# Patient Record
Sex: MALE | Race: WHITE | Employment: FULL TIME | URBAN - NONMETROPOLITAN AREA
[De-identification: names, ages, dates, MRNs, and addresses within clinical notes are randomized per-mention and may not be internally consistent; named-entity substitution may affect disease eponyms.]

---

## 2021-05-14 ENCOUNTER — HOSPITAL ENCOUNTER (OUTPATIENT)
Dept: PSYCHIATRY | Age: 26
Setting detail: THERAPIES SERIES
Discharge: HOME | End: 2021-05-14
Payer: COMMERCIAL

## 2021-05-14 PROCEDURE — 80305 DRUG TEST PRSMV DIR OPT OBS: CPT

## 2021-05-14 PROCEDURE — 90791 PSYCH DIAGNOSTIC EVALUATION: CPT

## 2021-05-14 ASSESSMENT — ANXIETY QUESTIONNAIRES
5. BEING SO RESTLESS THAT IT IS HARD TO SIT STILL: 0-NOT AT ALL
3. WORRYING TOO MUCH ABOUT DIFFERENT THINGS: 0-NOT AT ALL
6. BECOMING EASILY ANNOYED OR IRRITABLE: 0-NOT AT ALL
1. FEELING NERVOUS, ANXIOUS, OR ON EDGE: 0-NOT AT ALL

## 2021-05-14 ASSESSMENT — LIFESTYLE VARIABLES: HISTORY_ALCOHOL_USE: YES

## 2021-05-14 NOTE — PROGRESS NOTES
[facility]                Progress Note    [] Jaden  [x] Hillary hdz                    Patient Name: Ivania Jaime   : 1995     Case # :  [id number]  Therapist: Micha Dumont        Objective/Service/Time:    UDS . 28 IT K1.0 Topic Asmt  S- client reported Alcohol abuse 5 years, 2 JANETH's.   O- Denies MH, Oriented and cooperative  A- Asmt complete next week, SIMON for Self, Pending Court, UDS Complete  P- GT, IT, work out                  Micha Dumont MA, [facility], RAISA, Marjorie 27 21, 2:51 PM

## 2021-05-14 NOTE — PROGRESS NOTES
[facility]                     CLINICAL DIAGNOSIS SUMMARY    Location: [] [place] [] [place]                   Patient Name: Vivi Casey   : 1995     Case # :  DM  Therapist: Jeff العراقي      1. Identifying information:  Vivi Casey / 1995        WSM, 23, resides with Mother, Step Father, and works FT at [employer] and enjoys working on model cars    2. Substance use history:  F10.20 Other and unspecified alcohol dependence/unspecified drinking behavior       Drinking age [age], Beer 4-5 on weekends up to 25 tolerance no withdraws, and some blackouts, increase drinking due to socialization with peers. 3. Consequences of substance use: (personal, inter-personal, legal, occupational, medical, nutritional,       Leisure, spiritual, etc.)            2 JANETH's  , 3 day class  2021, [place] [address] Hearing    4. Co-existing problems;  (mental health, psychiatric, previous treatment programs, family       Problems, social, educational, etc.)         Denies    5. Treatment needs, barriers to treatment, impact of disease on life:     No transportation    Summary of Medical History:  Prior to Admission medications    Not on File     Past Surgical History:   Procedure Laterality Date    FOOT SURGERY      TONSILLECTOMY       History reviewed. No pertinent past medical history. There are no active problems to display for this patient. 6. Level of Care Determination:      1 Outpatient Services   7. Treatment available      __x__yes   _____no         8.   Name of program referred:    _x___[facility],    ____[facility],       _______other/identify     Electronically signed by Jeff العراقي on 2021 at 2:17 PM

## 2021-05-14 NOTE — PROGRESS NOTES
[address] [place] [address] Urinalysis Laboratory Testing and Medical History      Location: [] [place] [x] Natalie Powers MD., [id number], Medical Director of [facility] Director orders for [address] [place] [address] clinical therapists to collect an urine sample from:    Client: Ivania Jaime   : 1995   Case# DM    Urine sample will be collected following the collections guidelines provided on Clinical Reference Laboratory [facility]) custody form, and completion of the [facility] Non-Federal chain of custody drug screening form. During the course of treatment, randomly a urine sample will be collected, at a minimum of one time a month, more frequently as needed, as part of the clinical outpatient alcohol and drug treatment program at [address] [place] [address]. Medical care recommendation for clients experiencing/reporting  medical concerns, who do not have a family physician and willing to attend  medical care will be assisted in seeking medical care. Clinical providers will refer clients to local family physicians practices as part of the  clients treatment plan and assist the client in gaining access to an appointment. Release of information will be requested to support the  clients seeking medical care. Summary of Medical History  Prior to Admission medications    Not on File     Past Surgical History:   Procedure Laterality Date    FOOT SURGERY      TONSILLECTOMY       History reviewed. No pertinent past medical history. There are no active problems to display for this patient.       Micha Dumont [facility]  2021/1:51 PM

## 2021-05-21 ENCOUNTER — HOSPITAL ENCOUNTER (OUTPATIENT)
Dept: PSYCHIATRY | Age: 26
Setting detail: THERAPIES SERIES
Discharge: HOME | End: 2021-05-21
Payer: COMMERCIAL

## 2021-05-21 PROCEDURE — 90834 PSYTX W PT 45 MINUTES: CPT

## 2021-05-21 NOTE — PROGRESS NOTES
[facility]                Progress Note    [] Jaden  [x] Hillary hdz                    Patient Name: Dwain Tello   : 1995     Case # :  [id number]  Therapist: Lennox Pickler, [facility]        Objective/Service/Time:    K1.0, Treatment Planning in Ind Counseling  S- Client reports, due to work and yard work. Client is goal oriented. O- Client positive attitude towards counseling  A- Treatment planning, and reality therapy use [place]. P- GT and IT, working out.                   Lennox Pickler, MA, CASEY, LSW, MAC 21, 2:44 PM

## 2021-05-27 ENCOUNTER — HOSPITAL ENCOUNTER (OUTPATIENT)
Dept: PSYCHIATRY | Age: 26
Setting detail: THERAPIES SERIES
Discharge: HOME | End: 2021-05-27
Payer: COMMERCIAL

## 2021-05-27 PROCEDURE — 90834 PSYTX W PT 45 MINUTES: CPT

## 2021-05-27 PROCEDURE — 90853 GROUP PSYCHOTHERAPY: CPT

## 2021-05-27 ASSESSMENT — ANXIETY QUESTIONNAIRES
5. BEING SO RESTLESS THAT IT IS HARD TO SIT STILL: 1
GAD7 TOTAL SCORE: 3
1. FEELING NERVOUS, ANXIOUS, OR ON EDGE: 0
3. WORRYING TOO MUCH ABOUT DIFFERENT THINGS: 1
2. NOT BEING ABLE TO STOP OR CONTROL WORRYING: 0

## 2021-05-27 NOTE — PROGRESS NOTES
[address] [facility]                Progress Note    [] Jaden  [x] Sonia Carlson                    Patient Name: Leticia Santamaria   : 1995     Case # :  [id number]  Therapist: Jose Manuel Segura [facility]        Objective/Service/Time:    K1.0 , Ind Counseling Topic: -  S-Client reports, working and supporting with family. Client working at [employer] and enjoying his family. O- Client cooperative, full range, motivated, sober per his report  A-Client and this writer discussed impact of social and binge drinking.  Client reported his style of drinking, boredom and social.  P- GT, Work, finding hobby, IT                Jose Manuel Segura MA, [place], [place], KARLY MILLAN [facility] [place] 21, 8:56 AM

## 2021-05-28 ENCOUNTER — APPOINTMENT (OUTPATIENT)
Dept: PSYCHIATRY | Age: 26
End: 2021-05-28
Payer: COMMERCIAL

## 2021-06-03 ENCOUNTER — HOSPITAL ENCOUNTER (OUTPATIENT)
Dept: PSYCHIATRY | Age: 26
Setting detail: THERAPIES SERIES
Discharge: HOME | End: 2021-06-03
Payer: COMMERCIAL

## 2021-06-03 PROCEDURE — 90834 PSYTX W PT 45 MINUTES: CPT

## 2021-06-03 PROCEDURE — 90853 GROUP PSYCHOTHERAPY: CPT

## 2021-06-04 ENCOUNTER — APPOINTMENT (OUTPATIENT)
Dept: PSYCHIATRY | Age: 26
End: 2021-06-04
Payer: COMMERCIAL

## 2021-06-10 ENCOUNTER — HOSPITAL ENCOUNTER (OUTPATIENT)
Dept: PSYCHIATRY | Age: 26
Setting detail: THERAPIES SERIES
Discharge: HOME | End: 2021-06-10
Payer: COMMERCIAL

## 2021-06-10 PROCEDURE — 90834 PSYTX W PT 45 MINUTES: CPT

## 2021-06-10 PROCEDURE — 90853 GROUP PSYCHOTHERAPY: CPT

## 2021-06-10 NOTE — GROUP NOTE
[address] [facility] Group Therapy Note      6/10/2021    Location:  [employer]    Clients Presents: [phone]     Clients Absent: [phone]    Length of session: 1.5 hours    Group Note: OP    Group Type: Co-Ed    New members were welcomed and introduced. Norms and expectations of group were discussed. Content: Client's discussed Step 2, 3, surrendering your addiction & control to a Higher Power, viewed & discussed Shaken by Dania Yao, and 3rd Step by Father Destini Ha. Client applied to their sober journey and story. [facility] [place]  6/10/2021 11:39 AM    Co-Therapist: N/A      [facility] Individual Group Progress Note    Francia Patterson  1995  6/10/2021    Notes on Client Progress in Group    Client maintaining by working, fellowship with family, attending open [employer], Client came to believe he needed help when he blackout after drinking and his mother was visibly upset.   Client maintains sober living    [facility] [place]  6/10/2021 11:48 AM    Co-Therapist: N/A

## 2021-06-10 NOTE — PROGRESS NOTES
[facility]                Progress Note    [] Jaden  [x] Hillary hdz                    Patient Name: Luma Reynoso   : 1995     Case # :  [id number]  [facility]        Objective/Service/Time:    IT 1.0 K1.0  4 challenges of early recovery  S- Client reports staying busy with work, home task and attending open houses. Client filled out transportation application for tokens. O- Client focused cooperative and engaged  A-Client and this writer discussed the 4 challenges of early recovery. Client cooperative and engaged. P- Client displaying responsibility to work, treatment and sober living by maintaining a job attending treatment.                   Jace Hager MA, CASEY, LSW, MAC 06/10/21, 9:10 AM

## 2021-06-11 ENCOUNTER — APPOINTMENT (OUTPATIENT)
Dept: PSYCHIATRY | Age: 26
End: 2021-06-11
Payer: COMMERCIAL

## 2021-06-17 ENCOUNTER — HOSPITAL ENCOUNTER (OUTPATIENT)
Dept: PSYCHIATRY | Age: 26
Setting detail: THERAPIES SERIES
Discharge: HOME | End: 2021-06-17
Payer: COMMERCIAL

## 2021-06-17 PROCEDURE — 90853 GROUP PSYCHOTHERAPY: CPT

## 2021-06-17 PROCEDURE — 80305 DRUG TEST PRSMV DIR OPT OBS: CPT

## 2021-06-17 PROCEDURE — 90834 PSYTX W PT 45 MINUTES: CPT

## 2021-06-17 NOTE — PROGRESS NOTES
[facility]                Progress Note    [] Jaden  [x] Hillary wilder                    Patient Name: Holley Bergeron   : 1995     Case # :  [id number]  Therapist: Lupe Oseguera, [address]        Objective/Service/Time:    K1.0 UDS . 35 Topic:  Treatment book, BUDDING  S- Client reports, busy at [employer] with  [address]. Client helping with chores at home. O- Client motivated, focused and engaged, waiting hearing in 2021  A- Client and this writer processed waiting on sentencing. Client further discussed BUDDING, and Relapse Prevention tools, [place]. P- GT, IT, work and family support.                       Lupe Oseguera MA, LPC, LSW, MAC 21, 8:50 AM
Never smoker

## 2021-06-17 NOTE — GROUP NOTE
612 [facility] Group Therapy Note      6/17/2021    Location:  [facility]    Clients Presents: [phone]    Clients Absent: [phone]    Length of session: 1.5 hours    Group Note: OP    Group Type: Co-Ed    New members were welcomed and introduced. Norms and expectations of group were discussed. Content: Client discussed the Serenity Tool, Wisemind Tool, and Listened to Father Tor Guillermo and discussed taking Moral Inventory. Jeff [facility]  6/17/2021 11:39 AM    Co-Therapist: N/A      [facility] Individual Group Progress Note    Vivi Casey  1995 6/17/2021    Notes on Client Progress in Group  Client displays attentive, cooperative and supportive behaviors. Client relies on work, and enjoys remodeling cars.  Client denies use    Jeff [facility]  6/17/2021 11:49 AM    Co-Therapist: N/A

## 2021-06-18 ENCOUNTER — APPOINTMENT (OUTPATIENT)
Dept: PSYCHIATRY | Age: 26
End: 2021-06-18
Payer: COMMERCIAL

## 2021-06-24 ENCOUNTER — HOSPITAL ENCOUNTER (OUTPATIENT)
Dept: PSYCHIATRY | Age: 26
Setting detail: THERAPIES SERIES
Discharge: HOME | End: 2021-06-24
Payer: COMMERCIAL

## 2021-06-24 PROCEDURE — 90853 GROUP PSYCHOTHERAPY: CPT

## 2021-06-24 PROCEDURE — 90834 PSYTX W PT 45 MINUTES: CPT

## 2021-06-24 NOTE — PROGRESS NOTES
[address] [facility]                Progress Note    [] Jaden  [x] Danny Nguyen                    Patient Name: Deedee Juárez   : 1995     Case # :   Therapist: Kamila Huggins [facility]        Objective/Service/Time:    K1.0  Ind Counseling  making the most out of my strengths:lesson   preparing for hobbies  S- Client reports, preparing for shut down, Client's plans stay home and take care of the animals and hopefully work on his car. O- Client reports, determined, focused and honest in his response to sober living  A- Client and this writer discussed his motivation and his strengths that enable him to be sober. 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 P- Next week GT, check out smart recovery or sober grid, focus on hobbies.                   Kamila Huggins MA, CASEY, LSW, [facility] 21, 8:48 AM

## 2021-06-24 NOTE — GROUP NOTE
612 [facility] Group Therapy Note      6/24/2021    Location:  [facility]    Clients Presents: [phone]    Clients Absent:     Length of session: 1.5 hours    Group Note: OP    Group Type: Co-Ed    New members were welcomed and introduced. Norms and expectations of group were discussed. Content: Client's discussed the disease concept and tools to prevent relapse from Smart Recovery. Client's discussed D. Strawberry Story of recovery and 4 more days story. [place]  6/24/2021 11:42 AM    Co-Therapist: N/A      [facility] Individual Group Progress Note    Ariela Johnson  1995 6/24/2021    Notes on Client Progress in Group    Client reported staying focused per work and preparing for hobbies. Client was attentive and displays a positive attitude in GT.      [place]  6/24/2021 11:46 AM    Co-Therapist: N/A

## 2021-07-01 ENCOUNTER — HOSPITAL ENCOUNTER (OUTPATIENT)
Dept: PSYCHIATRY | Age: 26
Setting detail: THERAPIES SERIES
Discharge: HOME | End: 2021-07-01
Payer: COMMERCIAL

## 2021-07-01 NOTE — GROUP NOTE
612 [facility] Group Therapy Note      7/1/2021    Location:  [facility] [employer]    Clients Presents: [address] [facility], [address] [place] [address], [phone]    Clients Absent: [phone]    Length of session: 1.5 hours    Group Note: OP    Group Type: Co-Ed    New members were welcomed and introduced. Norms and expectations of group were discussed. Content:  Clients introduced themselves, reviewed intro sheet, reviewed brief history of what brought them to treatment, completed worksheet on the positive supports in their lives to support their sobriety    Nyasia Montalvo  7/7/0542 9:75 PM    Co-Therapist: N/A      [facility] Individual Group Progress Note    Jessica Alexandre  1995 7/1/2021    Notes on Client Progress in Group  Client reported alcohol use, 2nd JANETH, works at [employer], they are off on week shut down. Report family support of his sobriety, but stated they don't talk much in his family, and wasn't sure who he would talk to for support.     Reason for Absence: kept    Zulma Yadav [facility]  0/7/8691 0:46 PM    Co-Therapist: N/A

## 2021-07-08 ENCOUNTER — HOSPITAL ENCOUNTER (OUTPATIENT)
Dept: PSYCHIATRY | Age: 26
Setting detail: THERAPIES SERIES
Discharge: HOME | End: 2021-07-08
Payer: COMMERCIAL

## 2021-07-08 PROCEDURE — 90853 GROUP PSYCHOTHERAPY: CPT

## 2021-07-08 PROCEDURE — 90834 PSYTX W PT 45 MINUTES: CPT

## 2021-07-08 NOTE — PROGRESS NOTES
[facility]                Progress Note    [] Jaden  [x] Hillary hdz                    Patient Name: Kel Swenson   : 1995     Case # :  [id number]  [facility]        Objective/Service/Time:    IT 1.0 K 1.0 Topic: Holiday and Preparing for Court, , Relapse Supports  S- Client reports, holiday and vacation was boring. Client has court next week and has spoke with . Client received tokens and progress reports, copies. O- Cooperative, focused, engaged  A- Client discussed court and preparing, sober supports online and Apps. Client wanting to start hobbies, remodeling cars and going to car shows. P- GT,IT, Online resources, Hobbies and attending court.                 Xena Vega MA, LPC, LSW, MAC 21, 9:15 AM

## 2021-07-15 ENCOUNTER — HOSPITAL ENCOUNTER (OUTPATIENT)
Dept: PSYCHIATRY | Age: 26
Setting detail: THERAPIES SERIES
Discharge: HOME | End: 2021-07-15
Payer: COMMERCIAL

## 2021-07-15 PROCEDURE — 90853 GROUP PSYCHOTHERAPY: CPT

## 2021-07-15 PROCEDURE — 90834 PSYTX W PT 45 MINUTES: CPT

## 2021-07-15 NOTE — GROUP NOTE
[address] [facility] Group Therapy Note      7/15/2021    Location:  [employer]    Clients Presents: [phone]    Clients Absent:     Length of session: 1.5 hours    Group Note: OP    Group Type: Co-Ed    New members were welcomed and introduced. Norms and expectations of group were discussed. Content: Client's discussed managing your mood, changes of change and Win [employer] (Mindfulness/Flip the Switch Habit). [facility] [place]  7/15/2021 11:50 AM    Co-Therapist: N/A      [facility] Individual Group Progress Note    Silvia Guillen  1995  7/15/2021    Notes on Client Progress in Group    Client encouraged by court outcome, family support and work. Client aware of others moods and generally \"lets it roll off of is back\".     Emaefrain Kuo [facility]  7/15/2021 11:59 AM    Co-Therapist: N/A

## 2021-07-15 NOTE — PROGRESS NOTES
[facility]                Progress Note    [] Jaden  [x] Hillary hdz                    Patient Name: Hetal Schafer   : 1995     Case # :  [id number]  Therapist: Shayy Upton, [address]        Objective/Service/Time:    1 Hour, Ind Counseling -2- completion of Treatment book, Review of 7  the day skills (IT and GT), 3/1 legal update  S-Client reports, very tired over slept. Client stated graduation party for sister went well. Court sentencing went well, Probation, Alcohol Monitoring, and 5 days in [place] (over labor day). O- Client focused, engaged, relieved, tired  A- Client discussed [place] [facility] and completed the treatment book, and discussed legal sentencing at [employer]. P- Probation meeting next week, work, online supports, monitoring of alcohol.                 Shayy Upton MA, LPC, LSW, [facility] 07/15/21, 9:23 AM

## 2021-07-15 NOTE — PROGRESS NOTES
612 [facility] TREATMENT PLAN      Location: [] [place] [x] Hillary hdz    Treatment plan: Initial    Strengths: work ethic    Weakness/Limitations: Use of Alcohol, Legal    Service/Frequency/Duration: IT, GT Weekly, UDS . 35 all in 90 days    Diagnosis: F10.20 Other and unspecified alcohol dependence/unspecified drinking behavior    Level of Care: 1 Outpatient Services    1. Problem: Abuse of Alcohol   a. Goal: Develop a plan of sober living in 90 days  b. Objectives:   i. 1) Client Utilize treatment booklet to develop a Relapse Prevention Plan in 90 days Evaluation Date: 8/21/21 Code: C Continue TBD Achieved, 7/15/21  ii. 2) Client utilize the [employer], other activities 2-3 monthly in 90 days Evaluation Date: 8/21/21 Code: C Continue TBD      2. Problem: Stressors related to family and boredom  a. Goal: Develop coping plan to find skills address these problems in 90 days  b. Objectives:   i. 1) Learn 7 skills in 90 days  Evaluation Date: 8/21/21Code: C Continue TBD [employer], GT on 7/15/21 Achieved  ii. 2) Seek out positive peer supports and professional supports 2x monthly in 90 days Evaluation Date: 8/21/21Code: C Continue TBD       3. Problem: Legal  a. Goal: Meet expectation of [employer] in 90 days  b. Objectives:   i. 1) Attend hearing, meetings and pay sanctions in 90 days Evaluation Date: 8/21/21 Code: C Continue TBD On Probation 2 years, [place] 18 days and 5 days in [place], ongoing 7/15/21      Defer: Develop a Career    Discharge Plan/Instructions: Client is ready to maintain sober life and utilize skills & supports to accomplish this. Ana Hollis / 1995 has participated in the treatment plan development outlined above on 7/15/2021.      Jordi Petersen [facility]  7/15/2021/9:19 AM

## 2021-07-22 ENCOUNTER — HOSPITAL ENCOUNTER (OUTPATIENT)
Dept: PSYCHIATRY | Age: 26
Setting detail: THERAPIES SERIES
Discharge: HOME | End: 2021-07-22
Payer: COMMERCIAL

## 2021-07-22 PROCEDURE — 90853 GROUP PSYCHOTHERAPY: CPT

## 2021-07-22 PROCEDURE — 90834 PSYTX W PT 45 MINUTES: CPT

## 2021-07-22 NOTE — PROGRESS NOTES
[address] [facility]                Progress Note    [] [place]  [] Hillary hdz                    Patient Name: Kel Swenson   : 1995     Case # : [id number]  Therapist: Xena Vega, [address]        Objective/Service/Time:    IT, 1 Hour, UDS . 35   Coping -2 JD coping  S- Client reports, coping with work and waiting on being assigned a PO. Client aware of emotions and JD. O- Discussed and cooperative, JD at work and home  A- Client and this writer processed delay in probation. Client noted some stressors at home. Client recognizes his Style Under Stress & does not to be around a lot of people and \"drama\"  P-  Tentative completion Mid August, use coping skills, PO meeting Tuesday.                   Xena Vega MA, LPC, LSW, MAC 21, 9:02 AM

## 2021-07-22 NOTE — GROUP NOTE
[address] [facility] Group Therapy Note      7/22/2021    Location:  [employer]    Clients XIIOALBL:[phone]    Clients Absent: [phone]    Length of session: 1.5 hours    Group Note: OP    Group Type: Co-Ed    New members were welcomed and introduced. Norms and expectations of group were discussed. Content: Client's discussed their strengths related to their purpose at work, fulfillment, motivation and in relationship. Discussed Taffy Meredith story \"don't let the bear catch you\"    Shaniqua Dai [facility]  7/22/2021 11:34 AM    Co-Therapist: N/A      [facility] Individual Group Progress Note    Martha Mosher  1995 7/22/2021    Notes on Client Progress in Group    Client identifies creativity and adventurous as his strengths. Client maintaining sober living, working, and turns 27years old this week.     Shaniqua Dai [facility]  7/22/2021 11:38 AM    Co-Therapist: N/A

## 2021-07-29 ENCOUNTER — HOSPITAL ENCOUNTER (OUTPATIENT)
Dept: PSYCHIATRY | Age: 26
Setting detail: THERAPIES SERIES
Discharge: HOME | End: 2021-07-29
Payer: COMMERCIAL

## 2021-07-29 PROCEDURE — 90834 PSYTX W PT 45 MINUTES: CPT

## 2021-07-29 PROCEDURE — 90853 GROUP PSYCHOTHERAPY: CPT

## 2021-07-29 NOTE — PROGRESS NOTES
[facility]                Progress Note    [] [place]  [x] Pieter Black                    Patient Name: Julian Gil   : 1995     Case # :  [id number]  Therapist: Jose Daniel Adam [facility]        Objective/Service/Time:    K1.0 hour, Ind Counseling Topic stress related to legal obligations and setting goals, -2, 3/1  S. Client has a probation o, getting interlock, alcohol monitoring for 18 days, and 5 days in skilled nursing . O- Client tired, stress with legal obligations  A- Client processed issues of legal, and work. Client engaged and focused to complete treatment. P-GT, IT, monitoring and focused.                 Jose Daniel Adam MA, LPC, LSW, MAC 21, 8:54 AM

## 2021-08-05 ENCOUNTER — HOSPITAL ENCOUNTER (OUTPATIENT)
Dept: PSYCHIATRY | Age: 26
Setting detail: THERAPIES SERIES
Discharge: HOME | End: 2021-08-05
Payer: COMMERCIAL

## 2021-08-05 ENCOUNTER — APPOINTMENT (OUTPATIENT)
Dept: PSYCHIATRY | Age: 26
End: 2021-08-05
Payer: COMMERCIAL

## 2021-08-05 PROCEDURE — 90832 PSYTX W PT 30 MINUTES: CPT

## 2021-08-05 NOTE — PROGRESS NOTES
[facility]                Progress Note    [] Jaden  [x] Hillary hdz                    Patient Name: Santos Hernadez   : 1995     Case # : [id number]  Therapist: Dilan Lopez [facility]        Objective/Service/Time:    IT,  Client reported going to court and cancelled GT, Client agreed to Telehealth at 9 am, called several times and LVM,  Client was advised and confirm next weeks appointments. IT, K 30 minutes, Coping Skills, 11-2, DBT skills, emotional regulations at work, reviewed sober supports. S-Client was doing court/legal obligations, was placed on Ankle Monitor/alcohol for 18 days, 5 day sentence in Sept.  Client working on driving privileges. O- Client focused, engaged, cooperative  A- Discuss maintain sober living with DBT skills, online resources, Model cars, work and family support  P- GT, IT, resources, PO connection, use resources,  Hobbies model cars.                   Dilan Lopez MA, CASEY, RAISAW, MAC 21, 9:36 AM

## 2021-08-12 ENCOUNTER — HOSPITAL ENCOUNTER (OUTPATIENT)
Dept: PSYCHIATRY | Age: 26
Setting detail: THERAPIES SERIES
Discharge: HOME | End: 2021-08-12
Payer: COMMERCIAL

## 2021-08-12 PROCEDURE — 90834 PSYTX W PT 45 MINUTES: CPT

## 2021-08-12 PROCEDURE — 80305 DRUG TEST PRSMV DIR OPT OBS: CPT

## 2021-08-12 PROCEDURE — 90853 GROUP PSYCHOTHERAPY: CPT

## 2021-08-12 NOTE — GROUP NOTE
612 [facility] Group Therapy Note      8/12/2021    Location:  [employer]    Clients Presents: [phone]    Clients Absent: [phone]    Length of session: 1.5 hours    Group Note: OP    Group Type: Co-Ed    New members were welcomed and introduced. Norms and expectations of group were discussed. Content: Client's discussed Satisfied Mind, setting goals and coping with Change, Client responded to the cycle of change and [facility]  8/12/2021 11:56 AM    Co-Therapist: N/A      [facility] Individual Group Progress Note    Jacquelin Levy  1995 8/12/2021    Notes on Client Progress in Group    Client shared about work, legal sanctions and related to sniff when it comes to change. Client denies use, and is motivated.     [facility]  8/12/2021 12:02 PM    Co-Therapist: N/A

## 2021-08-12 NOTE — PROGRESS NOTES
[facility]                Progress Note    [] [place]  [x] Kim Elizabeth                    Patient Name: Krysta Esposito   : 1995     Case # :  [id number]  Therapist: Malcom Cheng, [address]        Objective/Service/Time:    K1 Hour, UDS . 35 Ind Counseling,  achieved treatment book 3/1 Alcohol monitoring, and driving privileges  S-Client reports, achieved treatment book, has alcohol monitoring, and driving privileges. Continues to work at [employer]. O-Focused, engaged, oriented  A- Processed issues, UDS, treatment booklet.   P- 2 weeks Gt, IT                  Malcom Cheng MA, LPC, LSW, [facility] 21, 9:02 AM

## 2021-08-19 ENCOUNTER — HOSPITAL ENCOUNTER (OUTPATIENT)
Dept: PSYCHIATRY | Age: 26
Setting detail: THERAPIES SERIES
Discharge: HOME | End: 2021-08-19
Payer: COMMERCIAL

## 2021-08-19 PROCEDURE — 90834 PSYTX W PT 45 MINUTES: CPT

## 2021-08-19 PROCEDURE — 90853 GROUP PSYCHOTHERAPY: CPT

## 2021-08-19 NOTE — PROGRESS NOTES
[facility]                Progress Note    [] Jaden  [x] Hillary hdz                    Patient Name: Su Roman   : 1995     Case # :  [id number]  Therapist: Shai Sullivan [facility]        Objective/Service/Time:    K45 min Hour IT,  Maintaining sober living  preparing for DC next week  S- Client reports, working and following protocol with driving mechanism and alcohol monitoring. O- tired working 2nd shift, focused, oriented  A- Discussed sober supports and coping with everything, serving monitoring till Monday. Discussed aftercare plan, including working out. Smoke cessation, and online resources. P-GT, IT,  Monitoring, [facility] sentencing 5 days.                   Shai Sullivan MA, LPC, LSW, MAC 21, 9:05 AM

## 2021-08-19 NOTE — GROUP NOTE
612 [facility] Group Therapy Note      8/19/2021    Location:  [employer]    Clients Presents: [phone]    Clients Absent:     Length of session: 1.5 hours    Group Note: OP    Group Type: Co-Ed    New members were welcomed and introduced. Norms and expectations of group were discussed. Content: Client's discussed 7 Habits of Highly effective people and prosocial coping skills,  Client's affirmed 2 GT who are completing GT. [facility]  8/19/2021 11:59 AM    Co-Therapist: N/A      [facility] Individual Group Progress Note    Stephanie Singh  1995 8/19/2021    Notes on Client Progress in Group    Client shared about work, and current legal sanctions. Client cooperative and participated in the synergy exercise.     Will[facility]  8/19/2021 12:04 PM    Co-Therapist: N/A

## 2021-08-26 ENCOUNTER — HOSPITAL ENCOUNTER (OUTPATIENT)
Dept: PSYCHIATRY | Age: 26
Setting detail: THERAPIES SERIES
Discharge: HOME | End: 2021-08-26
Payer: COMMERCIAL

## 2021-08-26 PROCEDURE — 90832 PSYTX W PT 30 MINUTES: CPT

## 2021-08-26 PROCEDURE — 90853 GROUP PSYCHOTHERAPY: CPT

## 2021-08-26 PROCEDURE — 80305 DRUG TEST PRSMV DIR OPT OBS: CPT

## 2021-08-26 NOTE — PROGRESS NOTES
[facility] TREATMENT PLAN      Location: [] [place] [x] Carol Mcdaniels    Treatment plan: Initial    Strengths: work ethic    Weakness/Limitations: Use of Alcohol, Legal    Service/Frequency/Duration: IT, GT Weekly, UDS . 35 all in 90 days    Diagnosis: F10.20 Other and unspecified alcohol dependence/unspecified drinking behavior    Level of Care: 1 Outpatient Services    1. Problem: Abuse of Alcohol   a. Goal: Develop a plan of sober living in 90 days  b. Objectives:   i. 1) Client Utilize treatment booklet to develop a Relapse Prevention Plan in 90 days Evaluation Date: 8/21/21 Code: C Continue TBD Achieved, 7/15/21  ii. 2) Client utilize the [employer], other activities 2-3 monthly in 90 days Evaluation Date: 8/21/21 Code: C Continue TBD working on models, attending car shows 8/12/21 achieved      2. Problem: Stressors related to family and boredom  a. Goal: Develop coping plan to find skills address these problems in 90 days  b. Objectives:   i. 1) Learn 7 skills in 90 days  Evaluation Date: 8/21/21Code: C Continue TBD [employer], GT on 7/15/21 Achieved  ii. 2) Seek out positive peer supports and professional supports 2x monthly in 90 days Evaluation Date: 8/21/21Code: C Continue TBD ongoing 8/19/21      3. Problem: Legal  a. Goal: Meet expectation of [employer] in 90 days  b. Objectives:   i. 1) Attend hearing, meetings and pay sanctions in 90 days Evaluation Date: 8/21/21 Code: C Continue TBD On Probation 2 years, [place] 18 days and 5 days in [place], ongoing 7/15/21      Defer: Develop a Career    Discharge Plan/Instructions: Client is ready to maintain sober life and utilize skills & supports to accomplish this. Kurt Huber / 1995 has participated in the treatment plan development outlined above on 8/26/2021.      Peg Matthew [facility]  8/26/2021/9:33 AM

## 2021-08-26 NOTE — GROUP NOTE
[facility] Group Therapy Note      8/26/2021    Location:  [facility]    Clients Presents: [phone]    Clients Absent: [id number]    Length of session: 1.5 hours    Group Note: OP    Group Type: Co-Ed    New members were welcomed and introduced. Norms and expectations of group were discussed. Content: Client's were introduced to tools, encouraged to bring masks; Client affirmed [id number] on his completion. Client discussed [employer], 12 step tools, specifically looked at Father Lovina Signs Step Alycia [facility]  8/26/2021 12:42 PM    Co-Therapist: N/A      [facility] Individual Group Progress Note    Melissa Talley  1995 8/26/2021    Notes on Client Progress in Group    Full range participation,  Client accepted and gave feedback on his Journey. Client's final GT.     Coco Garcia [facility]  8/26/2021 12:45 PM    Co-Therapist: N/A

## 2021-08-26 NOTE — PROGRESS NOTES
[facility]                Progress Note    [] [place]  [x] Ben Warner                    Patient Name: Payal Aleman   : 1995     Case # : [id number]  Therapist: [facility]        Objective/Service/Time:    IT 30 minutesUDS . 28     Called client over slept and his truck is broken down. Client will try to get a ride for group, if not client was offered Telehealth. The Client stated their name and soc #. Client agreed to [employer] and reported in a Confidential setting. Client and counselor will call back if there is a disconnect. Client has the crisis # for  needs. Client agreed completed lambert and survey and UDS & completed DC Plan on Telehealth at 1 pm.  S-Client reports working, broken down truck. Client preparing for senior living and completed alcohol monitoring.   O- overslept tired and engaged   A- completed surveys, UDS and [address]- follow DC plan                 Bismark Johansen MA, CASEY, LSW, MAC 21, 9:35 AM